# Patient Record
Sex: MALE | Race: WHITE | NOT HISPANIC OR LATINO | Employment: FULL TIME | URBAN - METROPOLITAN AREA
[De-identification: names, ages, dates, MRNs, and addresses within clinical notes are randomized per-mention and may not be internally consistent; named-entity substitution may affect disease eponyms.]

---

## 2018-10-31 ENCOUNTER — OFFICE VISIT (OUTPATIENT)
Dept: UROLOGY | Facility: CLINIC | Age: 28
End: 2018-10-31
Payer: COMMERCIAL

## 2018-10-31 VITALS
HEART RATE: 103 BPM | WEIGHT: 315 LBS | DIASTOLIC BLOOD PRESSURE: 109 MMHG | SYSTOLIC BLOOD PRESSURE: 158 MMHG | BODY MASS INDEX: 45.1 KG/M2 | HEIGHT: 70 IN

## 2018-10-31 DIAGNOSIS — E29.1 TESTICULAR FAILURE: Primary | ICD-10-CM

## 2018-10-31 PROCEDURE — 99999 PR PBB SHADOW E&M-NEW PATIENT-LVL III: CPT | Mod: PBBFAC,,, | Performed by: UROLOGY

## 2018-10-31 PROCEDURE — 99204 OFFICE O/P NEW MOD 45 MIN: CPT | Mod: S$GLB,,, | Performed by: UROLOGY

## 2018-10-31 NOTE — LETTER
October 31, 2018        Ivan Mojica MD  [address]  [address]  [facility]  [place] [address]             [facility] - Urology 4th Floor  [address] [place] [address]  Phone: [phone]   Patient: Willi Concepcion   MR Number: [id number]   YOB: 1990   Date of Visit: 10/31/2018       Dear Dr. Mojica:    Thank you for referring Willi Concepcion to me for evaluation. Attached you will find relevant portions of my assessment and plan of care.    If you have questions, please do not hesitate to call me. I look forward to following Willi Concepcion along with you.    Sincerely,      Calderon Sweet MD            CC  No Recipients    Enclosure

## 2018-10-31 NOTE — PROGRESS NOTES
"Chief Complaint:  Infertility    HPI:    Mr. Concepcion is a 28 y.o.  male who has been  to his wife for the past 8 years. They have been trying to achieve a pregnancy for the past 2 years but without success. Willi Concepcion has undergone a semen analysis x 3 showing azoospermia, but I only have the records for one. He denies a history of erectile dysfunction and ejaculatory problems.    He was started on TRT as a teen due to delayed puberty and undescended testicles.  His testicles did descend into the inguinal canal and are palpable.    He has achieved 0 pregnancies in the past.    Y chromosome microdeletion testing is normal    HORMONE PANEL 18  T--243  LH--3.54  FSH--3.28  Estradiol--57  Prolactin--8.2    SA ([facility]) 18--3.5 cc/azoospermia    Ariadne Concepcion is 27 years old. ( [birth year]) Her menses are regular. She has not undergone prior hysterosalpingogram. She has achieved 0 prior pregnancies.  She sees Dr. Mojica.    The couple has not undergone prior intrauterine insemination procedures.    The couple has not undergone prior in-vitro fertilization procedures.    Willi Concepcion denies a history of exposure to harmful chemicals, toxins, and radiation.    No history of recent fevers greater than 101.5 degrees Farenheit.    No history of recent exposure to "wet heat."    No history of urological trauma or testicular torsion.    No history of prostatitis, epididymitis, and orchitis.    No history of post-pubertal mumps.    There is no known family history of fertility problems.    REVIEW OF SYSTEMS:     He denies headache, blurred vision, fever, nausea, vomiting, chills, flank discomfort, abdominal pain, bleeding per rectum, cough, SOB, recent loss of consciousness, recent mental status changes, seizures, dizziness, or upper/lower extremity weakness.    PHYSICAL EXAM:     The patient generally appears in good health, is appropriately interactive, and is in no apparent distress. "     Eyes: anicteric sclerae, moist conjunctivae; no lid-lag; PERRLA     HENT: Atraumatic; oropharynx clear with moist mucous membranes and no mucosal ulcerations;normal hard and soft palate.  No evidence of lymphadenopathy.    Neck: Trachea midline.  No thyromegaly.    Musculoskeletal: No abnormal gait.    Skin: No lesions.    Mental: Cooperative with normal affect.  Is oriented to time, place, and person.    Neuro: Grossly intact.    Chest: Normal inspiratory effort.   No accessory muscles.  No audible wheezes.  Respirations symmetric on inspiration and expiration.    Heart: Regular rhythm.      Abdomen:  Soft, non-tender. No masses or organomegaly. Bladder is not palpable. No evidence of flank discomfort. No evidence of inguinal hernia.    Genitourinary: Penis is normal with no evidence of plaques or induration. Urethral meatus is normal. Scrotum is normal. Testes are within the inguinal canal bilaterally with no evidence of abnormal masses or tenderness.     Extremities: No cyanosis, clubbing, or edema.    IMPRESSION & PLAN:    Mr. Concepcion is a 28 y.o.  male who has been  to his wife for the past 8 years. They have been trying to achieve a pregnancy for the past 2 years but without success. Willi Concepcion has undergone a semen analysis x 3 showing azoospermia, but I only have the records for one. He denies a history of erectile dysfunction and ejaculatory problems.    He was started on TRT as a teen due to delayed puberty and undescended testicles.  His testicles did descend into the inguinal canal and are palpable.    He has achieved 0 pregnancies in the past.    Y chromosome microdeletion testing is normal    HORMONE PANEL 8/27/18  T--243  LH--3.54  FSH--3.28  Estradiol--57  Prolactin--8.2    SA ([facility]) 9/19/18--3.5 cc/azoospermia    1.  Went over the results of his SA and hormonal panel today.  2.  Discussed that his testicles are not descended, but they are palpable.  Therefore, don't  recommend orchiectomy.  However, we discussed that undescended testicles have impaired germ cell maturation. Do not recommend TESE as I don't believe we will find sperm.  3.  From a male factor perspective recommend donor sperm vs adoption.  4.  Please send a copy of the note to Dr. Mojica.  Thank you for the consultation.    CC: Yunior